# Patient Record
Sex: FEMALE | Race: WHITE | URBAN - METROPOLITAN AREA
[De-identification: names, ages, dates, MRNs, and addresses within clinical notes are randomized per-mention and may not be internally consistent; named-entity substitution may affect disease eponyms.]

---

## 2020-11-05 ENCOUNTER — OFFICE VISIT (OUTPATIENT)
Dept: URBAN - METROPOLITAN AREA CLINIC 33 | Facility: CLINIC | Age: 75
End: 2020-11-05
Payer: MEDICARE

## 2020-11-05 DIAGNOSIS — H26.493 OTHER SECONDARY CATARACT, BILATERAL: ICD-10-CM

## 2020-11-05 DIAGNOSIS — H53.2 DIPLOPIA: Primary | ICD-10-CM

## 2020-11-05 PROCEDURE — 99203 OFFICE O/P NEW LOW 30 MIN: CPT | Performed by: OPTOMETRIST

## 2020-11-05 ASSESSMENT — INTRAOCULAR PRESSURE
OD: 13
OS: 12

## 2020-11-05 ASSESSMENT — VISUAL ACUITY
OS: 20/30
OD: 20/20

## 2020-11-05 NOTE — IMPRESSION/PLAN
Impression: Diplopia: H53.2. Plan: Discussed condition with patient. Recommend patient has YAG OS done, then return for prism glasses.

## 2020-11-05 NOTE — IMPRESSION/PLAN
Impression: Other secondary cataract, bilateral: H26.493. Plan: Posterior capsular opacification present on lens implant and causing decreased vision. Educated patient about the pathophysiology of today's findings and recommend YAG capsulotomy treatment with cataract surgeon. 

Recommend YAG OS

## 2020-11-10 ENCOUNTER — OFFICE VISIT (OUTPATIENT)
Dept: URBAN - METROPOLITAN AREA CLINIC 33 | Facility: CLINIC | Age: 75
End: 2020-11-10
Payer: MEDICARE

## 2020-11-10 DIAGNOSIS — H35.363 DRUSEN (DEGENERATIVE) OF MACULA, BILATERAL: ICD-10-CM

## 2020-11-10 DIAGNOSIS — H43.813 VITREOUS DEGENERATION, BILATERAL: ICD-10-CM

## 2020-11-10 PROCEDURE — 99204 OFFICE O/P NEW MOD 45 MIN: CPT | Performed by: OPHTHALMOLOGY

## 2020-11-10 ASSESSMENT — INTRAOCULAR PRESSURE
OD: 16
OS: 16

## 2020-11-10 NOTE — IMPRESSION/PLAN
Impression: Diplopia: H53.2. Plan: Discussed condition with patient. Recommend patient has YAG OS done, then return for prism glasses after Laser.

## 2020-11-10 NOTE — IMPRESSION/PLAN
Impression: Other secondary cataract, bilateral: H26.493. Plan: Posterior capsular opacification present on lens implant and causing decreased vision. Proceed with YAG capsulotomy treatment  OS then OD. Discussed R/B/I  with patient.

## 2020-11-24 ENCOUNTER — SURGERY (OUTPATIENT)
Dept: URBAN - METROPOLITAN AREA SURGERY 15 | Facility: SURGERY | Age: 75
End: 2020-11-24
Payer: MEDICARE

## 2020-11-24 PROCEDURE — 66821 AFTER CATARACT LASER SURGERY: CPT | Performed by: OPHTHALMOLOGY

## 2020-12-01 ENCOUNTER — POST-OPERATIVE VISIT (OUTPATIENT)
Dept: URBAN - METROPOLITAN AREA CLINIC 33 | Facility: CLINIC | Age: 75
End: 2020-12-01

## 2020-12-01 DIAGNOSIS — Z48.810 ENCOUNTER FOR SURGICAL AFTERCARE FOLLOWING SURGERY ON A SENSE ORGAN: Primary | ICD-10-CM

## 2020-12-01 PROCEDURE — 99024 POSTOP FOLLOW-UP VISIT: CPT | Performed by: OPTOMETRIST

## 2020-12-01 ASSESSMENT — INTRAOCULAR PRESSURE
OS: 13
OD: 13

## 2020-12-01 NOTE — IMPRESSION/PLAN
Impression: S/P YAG OS - 7 Days. Encounter for surgical aftercare following surgery on a sense organ  Z48.810. Plan: Keep YAG OD with Dr. Whit Willis. --Advised patient to use artificial tears for comfort.

## 2021-01-05 ENCOUNTER — SURGERY (OUTPATIENT)
Dept: URBAN - METROPOLITAN AREA SURGERY 15 | Facility: SURGERY | Age: 76
End: 2021-01-05
Payer: MEDICARE

## 2021-01-05 PROCEDURE — 66821 AFTER CATARACT LASER SURGERY: CPT | Performed by: OPHTHALMOLOGY

## 2021-01-11 ENCOUNTER — POST-OPERATIVE VISIT (OUTPATIENT)
Dept: URBAN - METROPOLITAN AREA CLINIC 33 | Facility: CLINIC | Age: 76
End: 2021-01-11

## 2021-01-11 PROCEDURE — 99024 POSTOP FOLLOW-UP VISIT: CPT | Performed by: OPTOMETRIST

## 2021-01-11 ASSESSMENT — INTRAOCULAR PRESSURE
OD: 12
OS: 12

## 2021-01-11 NOTE — IMPRESSION/PLAN
Impression: S/P YAG Capsulotomy (Yttrium [employer]um [place]) OD - 6 Days. Encounter for surgical aftercare following surgery on a sense organ  Z48.810. Plan: Keep appointment for prism glasses.

## 2021-01-25 ENCOUNTER — TESTING ONLY (OUTPATIENT)
Dept: URBAN - METROPOLITAN AREA CLINIC 33 | Facility: CLINIC | Age: 76
End: 2021-01-25

## 2021-01-25 DIAGNOSIS — H52.4 PRESBYOPIA: ICD-10-CM

## 2021-01-25 ASSESSMENT — VISUAL ACUITY
OD: 20/25
OS: 20/25

## 2021-01-25 NOTE — IMPRESSION/PLAN
Impression: Presbyopia: H52.4. Plan: Issued MRx. Do not fill until after strabismus surgery. If residual diplopia exists post surgery, return for prism change.

## 2021-01-25 NOTE — IMPRESSION/PLAN
Impression: Diplopia: H53.2. Plan: Discussed diplopia concerns of patient. Patient reports having strabismus surgery w/ Dr. Radhika Amador in 2014 that was very effective. Patient reports over the last year and a half experiencing horizontal diplopia. Unsuccessfully trial framed prism in office. Prism exceeds 20 PD REMBERTO and 6 PD BD OS.

## 2022-02-02 ENCOUNTER — OFFICE VISIT (OUTPATIENT)
Dept: URBAN - METROPOLITAN AREA CLINIC 33 | Facility: CLINIC | Age: 77
End: 2022-02-02
Payer: MEDICARE

## 2022-02-02 PROCEDURE — 99213 OFFICE O/P EST LOW 20 MIN: CPT | Performed by: OPTOMETRIST

## 2022-02-02 ASSESSMENT — VISUAL ACUITY
OD: 20/20
OS: 20/25

## 2022-02-02 ASSESSMENT — INTRAOCULAR PRESSURE
OS: 14
OD: 13

## 2022-02-02 NOTE — IMPRESSION/PLAN
Impression: Diplopia: H53.2. Plan: S/P strabismus surgery on 02/21 with Dr. Adolfo Hahn. Recommend patient updates glasses RX with prism. Monitor condition yearly.

## 2022-02-02 NOTE — IMPRESSION/PLAN
Impression: Presbyopia: H52.4. Plan: Educated patient about today's findings. Order refraction to determine patient's best corrected visual acuity. Advised patient prism was added to new RX. Discussed patient may still experience double vision at times.